# Patient Record
Sex: FEMALE | Race: WHITE | NOT HISPANIC OR LATINO | URBAN - NONMETROPOLITAN AREA
[De-identification: names, ages, dates, MRNs, and addresses within clinical notes are randomized per-mention and may not be internally consistent; named-entity substitution may affect disease eponyms.]

---

## 2018-05-10 ENCOUNTER — PREPPED CHART (OUTPATIENT)
Dept: URBAN - NONMETROPOLITAN AREA CLINIC 1 | Facility: CLINIC | Age: 68
End: 2018-05-10

## 2022-03-23 ASSESSMENT — VISUAL ACUITY
OD_CC: J2
OD_PH: 20/30
OS_CC: 20/20
OS_CC: J5
OD_CC: 20/50

## 2022-03-23 ASSESSMENT — TONOMETRY
OD_IOP_MMHG: 12
OS_IOP_MMHG: 12

## 2022-03-30 ENCOUNTER — NEW PATIENT (OUTPATIENT)
Dept: URBAN - NONMETROPOLITAN AREA CLINIC 1 | Facility: CLINIC | Age: 72
End: 2022-03-30

## 2022-03-30 DIAGNOSIS — H52.4: ICD-10-CM

## 2022-03-30 PROCEDURE — S0620 ROUTINE OPHTHALMOLOGICAL EXA: HCPCS

## 2022-03-30 ASSESSMENT — TONOMETRY
OS_IOP_MMHG: 12
OD_IOP_MMHG: 12

## 2022-03-30 ASSESSMENT — VISUAL ACUITY
OD_PH: 20/60
OS_CC: 20/30
OD_CC: 20/200

## 2022-03-30 NOTE — PATIENT DISCUSSION
Discussed diagnosis in detail with patient. Reviewed symptoms related to cataract progression. Recommend refer to Dr. Foster Reasons for cataract evaluation. Patient elects to schedule.

## 2022-07-18 ENCOUNTER — CONTACT LENSES/GLASSES VISIT (OUTPATIENT)
Dept: URBAN - NONMETROPOLITAN AREA CLINIC 1 | Facility: CLINIC | Age: 72
End: 2022-07-18

## 2022-07-18 DIAGNOSIS — H52.4: ICD-10-CM

## 2022-07-18 PROCEDURE — 92015 DETERMINE REFRACTIVE STATE: CPT

## 2022-07-18 ASSESSMENT — VISUAL ACUITY
OD_CC: 20/200
OS_CC: 20/25

## 2022-07-18 NOTE — PATIENT DISCUSSION
Discussed diagnosis in detail with patient. Reviewed symptoms related to cataract progression. Recommend refer to Dr. Vianca Ford for cataract evaluation. Patient defers treatment at this time. Continue to monitor.